# Patient Record
Sex: FEMALE | URBAN - METROPOLITAN AREA
[De-identification: names, ages, dates, MRNs, and addresses within clinical notes are randomized per-mention and may not be internally consistent; named-entity substitution may affect disease eponyms.]

---

## 2017-01-09 ENCOUNTER — OFFICE VISIT (OUTPATIENT)
Dept: OTHER | Facility: OUTPATIENT CENTER | Age: 52
End: 2017-01-09

## 2017-01-09 DIAGNOSIS — F64.0 GENDER DYSPHORIA IN ADOLESCENT AND ADULT: Primary | ICD-10-CM

## 2017-01-09 DIAGNOSIS — F32.A DEPRESSION, UNSPECIFIED DEPRESSION TYPE: ICD-10-CM

## 2017-01-09 NOTE — PROGRESS NOTES
"[facility]  Case Progress Note      Client Name: Isabella Mclaughlin  YOB: 1965  MRN: [id number]  Treating Provider: Logan Arzate Psy.D.  Type of Session: Individual  Present in Session: Client only  Number of Minutes: 55  Treatment Plan due: 10/26/17  Date of Service: 17      Current Symptoms/Status:  Client is a ciarra male who identifies as a transgender woman. 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  Client also reported experiencing chronic worry over making others feel uncomfortable because of her gender identification.  Client endorsed recent feelings of hopelessness related to others, particularly her family members ever supporting and understanding her as a trans* person.  Client reported anxiety symptoms including chronic worry and difficulty controlling worry, as well as depressive symptoms including negative self-talk, decreased mood, and passive suicidal ideation.     Progress Toward Treatment Goals:   Client has been setting boundaries with those people she perceives as abusive and and unsupportive. She has been consistent in taking antidepressants and following through on behavioral activation techniques and has noted an improvement in depressive symptoms.  Client has been working to build her relationship with new friends.  She has also been going to a weekly bowling group.      Intervention: Modality and Description/ Response:  Utilized cognitive behavioral, solution-focused, and psychodynamic techniques to address gender dysphoria and client's concerns regarding internalized transphobia and transition related goals.  Client reported that she is still struggling to get up in the morning, as she has been having low " motivation.  Reframed her beliefs that staying in bed will be better for her beliefs than getting up and doing nothing productive.  Identified a list of proactive, pleasurable things that she could look forward to doing on her days off.     Client stated that she has plans to go out to eat with a new friend from work.  She added that she has also reached out to another friend from work and they have been spending more time together.  Reinforced her making this efforts.  Client stated that she now has a set date for April 30th with her vaginoplasty surgeon.  Discussed client's aftercare plan, which is staying in [place] for three weeks with her cousin taking care of her.  Identified ways in client might be able to finance her trip in a more feasible manner.  Client was engaged and open to feedback.  She presented in a more upbeat mood than in previous sessions.     Assignment:  Client will continue to practice behavioral activation strategies identified in session.     Ongoing: Client will continue to work on reaching out to her support network.  She will reach out to this writer should suicidal thoughts increase in severity.     Diagnosis:  Gender dysphoria in adolescents or adults - 302.85  Unspecified Depressive Disorder    R/O  Unspecified Anxiety Disorder    Interactive Complexity  None    Plan / Need for Future Services:  Client will be seen in 2 weeks for follow-up appointment.      Logan Arzate Psy.D., JENSEN  Postdoctoral Fellow

## 2017-01-10 NOTE — PROGRESS NOTES
I did not personally see the patient.  I reviewed and agree with the assessment and plan as documented in this note. Fe Nicholas, PhD, LP

## 2017-02-07 ENCOUNTER — OFFICE VISIT (OUTPATIENT)
Dept: OTHER | Facility: OUTPATIENT CENTER | Age: 52
End: 2017-02-07

## 2017-02-07 DIAGNOSIS — F64.0 GENDER DYSPHORIA IN ADOLESCENT AND ADULT: Primary | ICD-10-CM

## 2017-02-07 DIAGNOSIS — F32.A DEPRESSION, UNSPECIFIED DEPRESSION TYPE: ICD-10-CM

## 2017-02-07 NOTE — PROGRESS NOTES
"[facility]  Case Progress Note      Client Name: Isabella Mclaughlin  YOB: 1965  MRN: [id number]  Treating Provider: Logan Arzate Psy.D., LP  Type of Session: Individual  Present in Session: Client only  Number of Minutes: 55  Treatment Plan due: 10/26/17  Date of Service: 2/07/17      Current Symptoms/Status:  Client is birth assigned male who identifies as a transgender woman. 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  Client also reported experiencing chronic worry over making others feel uncomfortable because of her gender identification.  Client endorsed recent feelings of hopelessness related to others, particularly her family members ever supporting and understanding her as a trans* person.  Client reported anxiety symptoms including chronic worry and difficulty controlling worry, as well as depressive symptoms including negative self-talk, decreased mood, and passive suicidal ideation.     Progress Toward Treatment Goals:   Client has been setting boundaries with those people she perceives as abusive and and unsupportive. She has been consistent in taking antidepressants and following through on behavioral activation techniques and has noted a significant improvement in depressive symptoms.  Client has been working to build her relationship with new friends.     Intervention: Modality and Description/ Response:  Utilized cognitive behavioral, solution-focused, and psychodynamic techniques to address gender dysphoria and client's concerns regarding internalized transphobia and transition related goals.  Client reported that she has noticed a significant improvement in depression since last session.  Discussed potential contributing " factors to improvement, including beginning antidepressants, following through on behavioral activation techniques, and working on building her friendships.  Reinforced her being consistent with these efforts.  She added that she is also feeling excited about her date for vaginoplasty, which is set on .  Discussed updating her letters, as it appears they will  a couple weeks before her surgery date.  She called her surgeon's office while in session who informed her that it would not be an issue.   Developed client's plans for aftercare, including lodging while recovering in [place].  Client reported that her work is being cooperative in her taking time off and have been supportive overall.  Discussed client's new friendships and strategies for building upon these. Client was engaged and open to feedback.  She presented in a more upbeat mood than in previous sessions.     Assignment:  Ongoing: Client will continue to practice behavioral activation strategies identified in session.  Client will continue to work on reaching out to her support network..     Diagnosis:  Gender dysphoria in adolescents or adults - 302.85  Unspecified Depressive Disorder    R/O  Unspecified Anxiety Disorder    Interactive Complexity  None    Plan / Need for Future Services:  Client will be seen in 2 weeks for follow-up appointment.      Logan Arzate Psy.D., JENSEN  Postdoctoral Fellow

## 2017-02-07 NOTE — Clinical Note
This client is scheduled for surgery.  Her letters  a couple weeks before her date.  The clinic told her this was not an issue.  Does that sound reliable or should I just plan to update the letter to be safe?  Thanks.

## 2017-02-14 ENCOUNTER — OFFICE VISIT (OUTPATIENT)
Dept: OTHER | Facility: OUTPATIENT CENTER | Age: 52
End: 2017-02-14

## 2017-02-14 DIAGNOSIS — F64.0 GENDER DYSPHORIA IN ADOLESCENT AND ADULT: Primary | ICD-10-CM

## 2017-02-14 DIAGNOSIS — F32.A DEPRESSION, UNSPECIFIED DEPRESSION TYPE: ICD-10-CM

## 2017-02-14 NOTE — MR AVS SNAPSHOT
After Visit Summary   2/14/2017    Isabella Mclaughlin    MRN: [id number]           Patient Information     Date Of Birth          1965        Visit Information        Provider Department      2/14/2017 12:00 PM Logan Arzate PsyD [facility]        Today's Diagnoses     Gender dysphoria in adolescent and adult    -  1    Depression, unspecified depression type           Follow-ups after your visit        Your next 10 appointments already scheduled     Feb 21, 2017 10:00 AM CST   INDIVIDUAL THERAPY with Logan Arzate PsyD   [facility] ([facility])    [address]  [address]  [facility] [address]   [phone]            Mar 21, 2017  9:00 AM CDT   INDIVIDUAL THERAPY with Logan Arzate PsyD   [facility] ([facility])    [address]  [address]  [facility] [address]   [phone]              Who to contact     Please call your clinic at [phone] to:    Ask questions about your health    Make or cancel appointments    Discuss your medicines    Learn about your test results    Speak to your doctor   If you have compliments or concerns about an experience at your clinic, or if you wish to file a complaint, please contact [facility] Patient Relations at [phone] or email us at [email or web address]         Additional Information About Your Visit        CallidusCloudhart Information     [employer] gives you secure access to your electronic health record. If you see a primary care provider, you can also send messages to your care team and make appointments. If you have questions, please call your primary care clinic.  If you do not have a primary care provider, please call [phone] and they will assist you.      [employer] is an electronic gateway that provides easy, online access to your medical records. With [employer], you can request a clinic appointment, read your test results, renew a  prescription or communicate with your care team.     To access your existing account, please contact your [facility] or call [phone] for assistance.        Care EveryWhere ID     This is your Care EveryWhere ID. This could be used by other organizations to access your [place] medical records  [id number]         Blood Pressure from Last 3 Encounters:   09/21/16 99/64   03/17/16 110/73   10/19/15 112/78    Weight from Last 3 Encounters:   09/21/16 73 kg (161 lb)   03/17/16 75.8 kg (167 lb)   10/19/15 74.8 kg (165 lb)              We Performed the Following     Individual Psychotherapy (38-52 min) [92614]        Primary Care Provider    None       No address on file        Thank you!     Thank you for choosing [facility]  for your care. Our goal is always to provide you with excellent care. Hearing back from our patients is one way we can continue to improve our services. Please take a few minutes to complete the written survey that you may receive in the mail after your visit with us. Thank you!             Your Updated Medication List - Protect others around you: Learn how to safely use, store and throw away your medicines at www.disposemymeds.org.          This list is accurate as of: 2/14/17 11:59 PM.  Always use your most recent med list.                   Brand Name Dispense Instructions for use    dutasteride 0.5 MG capsule    AVODART    90 capsule    Take 1 capsule (0.5 mg) by mouth daily       estradiol 0.1 MG/24HR BIW patch    VIVELLE-DOT    48 patch    Place 2 patches onto the skin twice a week       spironolactone 100 MG tablet    ALDACTONE    180 tablet    Take 2 tablets (200 mg) by mouth daily

## 2017-02-14 NOTE — PROGRESS NOTES
"[facility] -  Case Progress Note      Client Name: Isabella Mclaughlin  YOB: 1965  MRN: [id number]  Treating Provider: Logan Arzate Psy.D., LP  Type of Session: Individual  Present in Session: Client only  Number of Minutes: 50  Treatment Plan due: 10/26/17  Date of Service: 2/14/17      Current Symptoms/Status:  Client is birth assigned male who identifies as a transgender woman. 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  Client also reported experiencing chronic worry over making others feel uncomfortable because of her gender identification.  Client endorsed recent feelings of hopelessness related to others, particularly her family members ever supporting and understanding her as a trans* person.  Client reported anxiety symptoms including chronic worry and difficulty controlling worry, as well as depressive symptoms including negative self-talk, decreased mood, and passive suicidal ideation.     Progress Toward Treatment Goals:   She has been consistent in taking antidepressants and following through on behavioral activation techniques and has noted a significant improvement in depressive symptoms.  Client has been working to build her relationship with new friends.     Intervention: Modality and Description/ Response:  Utilized cognitive behavioral, solution-focused, and psychodynamic techniques to address gender dysphoria and client's concerns regarding internalized transphobia and transition related goals.  Client reported that she has booked her hotel for her surgery in [place].  She reported that her cousin will now only be taking care of her for a few days, rather than the full week. Validated her concerns about this, as it will " "leave her alone in [place].  Client stated that she is working on finding someone else to stay with her longer.    Client reported that her depression has been significantly improved since beginning antidepressants and working on behavioral activation strategies.  Discussed client's shifting attitudes about her trans* identity.  She reported that she is beginning to feel more proud of her trans* identity as she has more people validate her identity and has set boundaries with non-affirming family members and friends.  Continued to process and explore client's history of \"self-loathing\" regarding her gender identity.  Client reported that she used to believe that she was always going to be alone because of her trans* identity.  Validated her pain and hurt over those who have rejected her and discussed the improvement she has noticed since working to accept herself and build a family of choice. Continued to process and address past trauma related to coming out in the early 1990's. Validated client's feelings of anger and sadness over the rejection she experienced from others.   Client was engaged and open to feedback.  She presented in a more upbeat mood than in previous sessions.     Assignment:  Ongoing: Client will continue to practice behavioral activation strategies identified in session.  Client will continue to work on reaching out to her support network..     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      Logan Arzate Psy.D., LP  Postdoctoral Fellow                                  "

## 2017-02-21 ENCOUNTER — OFFICE VISIT (OUTPATIENT)
Dept: OTHER | Facility: OUTPATIENT CENTER | Age: 52
End: 2017-02-21

## 2017-02-21 DIAGNOSIS — F32.A DEPRESSION, UNSPECIFIED DEPRESSION TYPE: ICD-10-CM

## 2017-02-21 DIAGNOSIS — F64.0 GENDER DYSPHORIA IN ADOLESCENT AND ADULT: Primary | ICD-10-CM

## 2017-02-21 NOTE — MR AVS SNAPSHOT
After Visit Summary   2/21/2017    Isabella Mclaughlin    MRN: [id number]           Patient Information     Date Of Birth          1965        Visit Information        Provider Department      2/21/2017 10:00 AM Logan Arzate PsyD [place] [facility]        Today's Diagnoses     Gender dysphoria in adolescent and adult    -  1    Depression, unspecified depression type           Follow-ups after your visit        Your next 10 appointments already scheduled     Mar 21, 2017  9:00 AM CDT   INDIVIDUAL THERAPY with Logan Arzate PsyD   [place] [facility]    [address]  [address]  [facility] [address]   [phone]              Who to contact     Please call your clinic at [phone] to:    Ask questions about your health    Make or cancel appointments    Discuss your medicines    Learn about your test results    Speak to your doctor   If you have compliments or concerns about an experience at your clinic, or if you wish to file a complaint, please contact [facility] Patient Relations at [phone] or email us at [email or web address]         Additional Information About Your Visit        MyChart Information     [employer] gives you secure access to your electronic health record. If you see a primary care provider, you can also send messages to your care team and make appointments. If you have questions, please call your primary care clinic.  If you do not have a primary care provider, please call [phone] and they will assist you.      [employer]O is an electronic gateway that provides easy, online access to your medical records. With [employer], you can request a clinic appointment, read your test results, renew a prescription or communicate with your care team.     To access your existing account, please contact your [facility] or call [phone] for assistance.        Care EveryWhere ID      This is your Care EveryWhere ID. This could be used by other organizations to access your [place] medical records  [id number]         Blood Pressure from Last 3 Encounters:   09/21/16 99/64   03/17/16 110/73   10/19/15 112/78    Weight from Last 3 Encounters:   09/21/16 73 kg (161 lb)   03/17/16 75.8 kg (167 lb)   10/19/15 74.8 kg (165 lb)              We Performed the Following     Individual Psychotherapy (53+ min) [97720]        Primary Care Provider    None       No address on file        Thank you!     Thank you for choosing [facility]  for your care. Our goal is always to provide you with excellent care. Hearing back from our patients is one way we can continue to improve our services. Please take a few minutes to complete the written survey that you may receive in the mail after your visit with us. Thank you!             Your Updated Medication List - Protect others around you: Learn how to safely use, store and throw away your medicines at www.disposemymeds.org.          This list is accurate as of: 2/21/17 11:59 PM.  Always use your most recent med list.                   Brand Name Dispense Instructions for use    dutasteride 0.5 MG capsule    AVODART    90 capsule    Take 1 capsule (0.5 mg) by mouth daily       estradiol 0.1 MG/24HR BIW patch    VIVELLE-DOT    48 patch    Place 2 patches onto the skin twice a week       spironolactone 100 MG tablet    ALDACTONE    180 tablet    Take 2 tablets (200 mg) by mouth daily

## 2017-03-06 DIAGNOSIS — F64.0 GENDER IDENTITY DISORDER IN ADOLESCENTS OR ADULTS: ICD-10-CM

## 2017-03-07 RX ORDER — ESTRADIOL 0.1 MG/D
2 FILM, EXTENDED RELEASE TRANSDERMAL
Qty: 48 PATCH | Refills: 0 | Status: SHIPPED | OUTPATIENT
Start: 2017-03-09 | End: 2017-04-25

## 2017-03-15 LAB
ANION GAP SERPL CALCULATED.3IONS-SCNC: 8 MMOL/L
BUN SERPL-MCNC: 12 MG/DL
CALCIUM SERPL-MCNC: 8.7 MG/DL
CHLORIDE SERPLBLD-SCNC: 104 MMOL/L
CO2 SERPL-SCNC: 28 MMOL/L
CREAT SERPL-MCNC: 1 MG/DL
GFR SERPL CREATININE-BSD FRML MDRD: >60 ML/MIN/1.73M2
GLUCOSE SERPL-MCNC: 93 MG/DL (ref 70–99)
POTASSIUM SERPL-SCNC: 3.9 MMOL/L
SODIUM SERPL-SCNC: 140 MMOL/L

## 2017-03-21 ENCOUNTER — OFFICE VISIT (OUTPATIENT)
Dept: OTHER | Facility: OUTPATIENT CENTER | Age: 52
End: 2017-03-21

## 2017-03-21 DIAGNOSIS — F64.0 GENDER DYSPHORIA IN ADOLESCENT AND ADULT: Primary | ICD-10-CM

## 2017-03-21 DIAGNOSIS — F32.A DEPRESSION, UNSPECIFIED DEPRESSION TYPE: ICD-10-CM

## 2017-03-21 NOTE — MR AVS SNAPSHOT
After Visit Summary   3/21/2017    Isabella Mclaughlin    MRN: [id number]           Patient Information     Date Of Birth          1965        Visit Information        Provider Department      3/21/2017 9:00 AM Logan Arzate PsyD [place] [facility]        Today's Diagnoses     Gender dysphoria in adolescent and adult    -  1    Depression, unspecified depression type           Follow-ups after your visit        Your next 10 appointments already scheduled     May 04, 2017  2:00 PM CDT   INDIVIDUAL THERAPY with Logan Arzate PsyD   [place] [facility] ([facility])    [address]  [address]  [facility] [address]   [phone]            May 15, 2017  1:00 PM CDT   INDIVIDUAL THERAPY with Logan Arzate PsyD   [place] [facility] ([facility])    [address]  [address]  [facility] [address]   [phone]            May 31, 2017  2:00 PM CDT   INDIVIDUAL THERAPY with Logan Arzate PsyD   [place] [facility] ([facility])    [address]  [address]  [facility] [address]   [phone]              Who to contact     Please call your clinic at [phone] to:    Ask questions about your health    Make or cancel appointments    Discuss your medicines    Learn about your test results    Speak to your doctor   If you have compliments or concerns about an experience at your clinic, or if you wish to file a complaint, please contact [facility] Patient Relations at [phone] or email us at [email or web address]         Additional Information About Your Visit        MyChart Information     [employer] gives you secure access to your electronic health record. If you see a primary care provider, you can also send messages to your care team and make appointments. If you have questions, please call your primary care clinic.  If you do not have a primary care provider, please call [phone]  and they will assist you.      Ch[employer]fy is an electronic gateway that provides easy, online access to your medical records. With [employer], you can request a clinic appointment, read your test results, renew a prescription or communicate with your care team.     To access your existing account, please contact your [facility] or call [phone] for assistance.        Care EveryWhere ID     This is your Care EveryWhere ID. This could be used by other organizations to access your [place] medical records  [id number]         Blood Pressure from Last 3 Encounters:   09/21/16 99/64   03/17/16 110/73   10/19/15 112/78    Weight from Last 3 Encounters:   09/21/16 73 kg (161 lb)   03/17/16 75.8 kg (167 lb)   10/19/15 74.8 kg (165 lb)              We Performed the Following     Individual Psychotherapy (53+ min) [42397]        Primary Care Provider    None       No address on file        Thank you!     Thank you for choosing [facility]  for your care. Our goal is always to provide you with excellent care. Hearing back from our patients is one way we can continue to improve our services. Please take a few minutes to complete the written survey that you may receive in the mail after your visit with us. Thank you!             Your Updated Medication List - Protect others around you: Learn how to safely use, store and throw away your medicines at www.disposemymeds.org.          This list is accurate as of: 3/21/17 11:59 PM.  Always use your most recent med list.                   Brand Name Dispense Instructions for use    dutasteride 0.5 MG capsule    AVODART    90 capsule    Take 1 capsule (0.5 mg) by mouth daily       estradiol 0.1 MG/24HR BIW patch    VIVELLE-DOT    48 patch    Place 2 patches onto the skin twice a week       spironolactone 100 MG tablet    ALDACTONE    180 tablet    Take 2 tablets (200 mg) by mouth daily
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
## 2017-03-22 NOTE — PROGRESS NOTES
I did not personally see the patient but I have reviewed and agree with the assessment and plan as documented in this note.  Anastasiya Bender, PhD -- Supervisor   Licensed Psychologist

## 2017-04-13 ENCOUNTER — TELEPHONE (OUTPATIENT)
Dept: OTHER | Facility: OUTPATIENT CENTER | Age: 52
End: 2017-04-13

## 2017-04-25 ENCOUNTER — OFFICE VISIT (OUTPATIENT)
Dept: OTHER | Facility: OUTPATIENT CENTER | Age: 52
End: 2017-04-25

## 2017-04-25 VITALS
HEART RATE: 80 BPM | HEIGHT: 69 IN | DIASTOLIC BLOOD PRESSURE: 66 MMHG | WEIGHT: 166.6 LBS | BODY MASS INDEX: 24.68 KG/M2 | SYSTOLIC BLOOD PRESSURE: 100 MMHG

## 2017-04-25 DIAGNOSIS — Z79.890 HORMONE REPLACEMENT THERAPY (POSTMENOPAUSAL): Primary | ICD-10-CM

## 2017-04-25 RX ORDER — ESTRADIOL 0.1 MG/D
1 FILM, EXTENDED RELEASE TRANSDERMAL
Qty: 24 PATCH | Refills: 2 | Status: SHIPPED | OUTPATIENT
Start: 2017-04-27

## 2017-04-25 RX ORDER — DOCUSATE SODIUM 100 MG/1
100 CAPSULE, LIQUID FILLED ORAL
COMMUNITY
Start: 2017-04-04 | End: 2017-04-25

## 2017-04-25 RX ORDER — MUPIROCIN 20 MG/G
OINTMENT TOPICAL
COMMUNITY
Start: 2017-04-04 | End: 2017-05-04

## 2017-04-25 RX ORDER — SENNOSIDES A AND B 8.6 MG/1
17.2 TABLET, FILM COATED ORAL
COMMUNITY
Start: 2017-04-04 | End: 2017-04-25

## 2017-04-25 RX ORDER — PAROXETINE 20 MG/1
20 TABLET, FILM COATED ORAL
COMMUNITY

## 2017-04-25 RX ORDER — TERBINAFINE HYDROCHLORIDE 250 MG/1
250 TABLET ORAL
COMMUNITY
End: 2017-04-25

## 2017-04-25 RX ORDER — ACETAMINOPHEN 325 MG/1
650 TABLET ORAL
COMMUNITY
Start: 2017-04-04 | End: 2017-05-04

## 2017-04-25 ASSESSMENT — ENCOUNTER SYMPTOMS
LIGHT-HEADEDNESS: 0
WEAKNESS: 0
VOMITING: 0
HEADACHES: 0
SHORTNESS OF BREATH: 0
FREQUENCY: 0
UNEXPECTED WEIGHT CHANGE: 0
ABDOMINAL PAIN: 0
CHEST TIGHTNESS: 0
NERVOUS/ANXIOUS: 0
FEVER: 0
DYSPHORIC MOOD: 0
NUMBNESS: 0
CHILLS: 0
PALPITATIONS: 0
POLYDIPSIA: 0

## 2017-04-25 NOTE — MR AVS SNAPSHOT
After Visit Summary   4/25/2017    Isabella Mclaughlin    MRN: [id number]           Patient Information     Date Of Birth          1965        Visit Information        Provider Department      4/25/2017 1:20 PM Markel Shepherd MD [facility]        Today's Diagnoses     Hormone replacement therapy (postmenopausal)    -  1       Follow-ups after your visit        Follow-up notes from your care team     Return in about 3 months (around 7/25/2017).      Your next 10 appointments already scheduled     May 04, 2017  2:00 PM CDT   INDIVIDUAL THERAPY with Logan Arzate PsyD   [facility] ([facility])    [address]  [address]  [facility] [address]   [phone]            May 15, 2017  1:00 PM CDT   INDIVIDUAL THERAPY with Logan Arzate PsyD   [facility] ([facility])    [address]  Mail [address]  [facility] [address]   [phone]            May 31, 2017  2:00 PM CDT   INDIVIDUAL THERAPY with Logan Arzate PsyD   [facility] ([facility])    [address]  [address]  [facility] [address]   [phone]              Who to contact     Please call your clinic at [phone] to:    Ask questions about your health    Make or cancel appointments    Discuss your medicines    Learn about your test results    Speak to your doctor   If you have compliments or concerns about an experience at your clinic, or if you wish to file a complaint, please contact [facility] Patient Relations at [phone] or email us at [email or web address]         Additional Information About Your Visit        MyChart Information     E-Box - [email or web address] gives you secure access to your electronic health record. If you see a primary care provider, you can also send messages to your care team and make appointments. If you have questions, please call your primary care clinic.  If you do not have a  "primary care provider, please call [phone] and they will assist you.      Good Seed is an electronic gateway that provides easy, online access to your medical records. With [employer] Seed, you can request a clinic appointment, read your test results, renew a prescription or communicate with your care team.     To access your existing account, please contact your [facility] or call [phone] for assistance.        Care EveryWhere ID     This is your Care EveryWhere ID. This could be used by other organizations to access your [facility] medical records  [id number]        Your Vitals Were     Pulse Height BMI (Body Mass Index)             80 1.753 m (5' 9\") 24.6 kg/m2          Blood Pressure from Last 3 Encounters:   04/25/17 100/66   09/21/16 99/64   03/17/16 110/73    Weight from Last 3 Encounters:   04/25/17 75.6 kg (166 lb 9.6 oz)   09/21/16 73 kg (161 lb)   03/17/16 75.8 kg (167 lb)              We Performed the Following     Basic metabolic panel          Today's Medication Changes          These changes are accurate as of: 4/25/17 11:59 PM.  If you have any questions, ask your nurse or doctor.               Start taking these medicines.        Dose/Directions    estradiol 0.1 MG/24HR BIW patch   Commonly known as:  VIVELLE-DOT   Used for:  Hormone replacement therapy (postmenopausal)        Dose:  1 patch   Place 1 patch onto the skin twice a week   Quantity:  24 patch   Refills:  2            Where to get your medicines      These medications were sent to [facility] [id number] [place] [address] AT [place] [address], [facility] [address]     Phone:  [phone]     estradiol 0.1 MG/24HR BIW patch                Primary Care Provider Office Phone # Fax #    [facility] [phone] [address] [facility] [address]        Thank you!     Thank you for choosing [facility]  " for your care. Our goal is always to provide you with excellent care. Hearing back from our patients is one way we can continue to improve our services. Please take a few minutes to complete the written survey that you may receive in the mail after your visit with us. Thank you!             Your Updated Medication List - Protect others around you: Learn how to safely use, store and throw away your medicines at www.disposemymeds.org.          This list is accurate as of: 4/25/17 11:59 PM.  Always use your most recent med list.                   Brand Name Dispense Instructions for use    acetaminophen 325 MG tablet    TYLENOL     Take 650 mg by mouth       estradiol 0.1 MG/24HR BIW patch    VIVELLE-DOT    24 patch    Place 1 patch onto the skin twice a week       mupirocin 2 % ointment    BACTROBAN         OXYCODONE HCL PO      Take 5 mg by mouth as needed       PARoxetine 20 MG tablet    PAXIL     Take 20 mg by mouth

## 2017-04-25 NOTE — NURSING NOTE
"Chief Complaint   Patient presents with     RECHECK       Vitals:    04/25/17 1347   BP: 100/66   Pulse: 80   Weight: 75.6 kg (166 lb 9.6 oz)   Height: 1.753 m (5' 9\")       Body mass index is 24.6 kg/(m^2).      Maria Del Carmen Ceballos CMA                        "

## 2017-04-25 NOTE — PROGRESS NOTES
ZACHARY Mason is a 51 year old individual that uses pronouns She/Her/Hers/Herself that presents today for follow up of:  feminizing hormone therapy.   Gender identity: female.   Started Hormone  therapy  1997  Continues on.See below  Any special concerns today?  Had gender confirmation surgery on 32/30/2017 with Dr. Temitope Faustin at [facility]. No complications. Dilating 4x/day. No difficulties with urination.   She did not restart hormones after this surgery, waited until this visit.  Up and doing all IADL's, able to walk easily, but fatigues.  Never did labs from 9/2016, but has labs from PCP office at [facility] and all of surgeons notes are in [facility] records.       On hormones?  No  Gender related body changes since last visit: none--see above    Activity: walking  New health concerns since last visit: Broke a tooth during hospital stay. Just before surgery had 3 day episode of hematuria, and extensive evaluation was negative, felt due to infection of prostate. Has not returned.  ---    No past surgical history on file.    Patient Active Problem List   Diagnosis     Gender dysphoria in adolescent and adult     S/P breast augmentation     S/P rhinoplasty     Spinal cord compression (H)     Depression       Current Outpatient Prescriptions   Medication Sig Dispense Refill     PARoxetine (PAXIL) 20 MG tablet Take 20 mg by mouth       mupirocin (BACTROBAN) 2 % ointment        acetaminophen (TYLENOL) 325 MG tablet Take 650 mg by mouth       OXYCODONE HCL PO Take 5 mg by mouth as needed       estradiol (VIVELLE-DOT) 0.1 MG/24HR BIW patch Place 2 patches onto the skin twice a week (Patient not taking: Reported on 4/25/2017) 48 patch 0     spironolactone (ALDACTONE) 100 MG tablet Take 2 tablets (200 mg) by mouth daily (Patient not taking: Reported on 4/25/2017) 180 tablet 1       History   Smoking Status     Never Smoker   Smokeless Tobacco     Not on file        No Known Allergies    Health Maintenance Due  "  Topic Date Due     TETANUS IMMUNIZATION (SYSTEM ASSIGNED)  07/29/1983     HEPATITIS C SCREENING  07/29/1983     PAP SCREENING Q3 YR (SYSTEM ASSIGNED)  07/29/1986     MAMMO SCREEN Q2 YR (SYSTEM ASSIGNED)  07/29/2005     COLON CANCER SCREEN (SYSTEM ASSIGNED)  07/29/2015         Problem, Medication and Allergy Lists were reviewed and are current..         Review of Systems:   Review of Systems   Constitutional: Negative for chills, fever and unexpected weight change.   Eyes: Negative for visual disturbance.   Respiratory: Negative for chest tightness and shortness of breath.    Cardiovascular: Negative for chest pain, palpitations and leg swelling.   Gastrointestinal: Negative for abdominal pain and vomiting.   Endocrine: Negative for polydipsia and polyuria.   Genitourinary: Negative for frequency.   Neurological: Negative for weakness, light-headedness, numbness and headaches.   Psychiatric/Behavioral: Negative for dysphoric mood and suicidal ideas. The patient is not nervous/anxious.             Physical Exam:     Vitals:    04/25/17 1347   BP: 100/66   Pulse: 80   Weight: 75.6 kg (166 lb 9.6 oz)   Height: 1.753 m (5' 9\")     BMI= Body mass index is 24.6 kg/(m^2).   Wt Readings from Last 10 Encounters:   04/25/17 75.6 kg (166 lb 9.6 oz)   09/21/16 73 kg (161 lb)   03/17/16 75.8 kg (167 lb)   10/19/15 74.8 kg (165 lb)   02/04/15 73.5 kg (162 lb)     Appearance: Female appearance and dress    GENERAL:: healthy, alert and no distress  RESP: lungs clear to auscultation - no rales, no rhonchi, no wheezes  CV: regular rates and rhythm, normal S1 S2, no S3 or S4 and no murmur, no click or rub -  EXT--NT  External  exam--Anatomical alignment appropriate; suture lines intact with healing appropriate to post-op stage. Sutures visible at medial aspect  labia minora bilaterally, with some loosening of sutures and separation on right.   No prurulent drainage, no bleeding    Affect: Appropriate/mood-congruent           Labs: "   Results from last visit:  Office Visit on 03/17/2016   Component Date Value Ref Range Status     Sodium 12/14/2015 137  mmol/L Final     Potassium 12/14/2015 4.4  mmol/L Final     Chloride 12/14/2015 104  mmol/L Final     CO2, TOTAL 12/14/2015 27  mmol/L Final     Anion Gap 12/14/2015 6  mmol/L Final     Glucose 12/14/2015 92  70 - 99 mg/dL Final     Urea Nitrogen 12/14/2015 14  mg/dL Final     Creatinine 12/14/2015 0.83  mg/dL Final     Calcium 12/14/2015 9.1  mg/dL Final     Cholesterol 12/14/2015 148  115 - 199 mg/dL Final     Triglycerides 12/14/2015 96  mg/dL Final     HDL Cholesterol 12/14/2015 37  mg/dL Final     LDL Cholesterol Calculated 12/14/2015 92  mg/dL Final     Non HDL Cholesterol 12/14/2015 111  mg/dl Final     Testosterone Total 12/14/2015 12  ng/dL Final       Assessment and Plan   1. Gender dysphoria s/p gender confirmation surgery  Labs from [facility] 3/2017 reviewed with patient  Discussed post-operative hormone maintenance dosing in light of age and lack of cardiac risk factors.   Discontinue spironolactone. Start Vivelle dot 0.1 mg patch 2x/wk    2. Gender specific health care maintenance reviewed  Recommend yearly mammogram  Discussed PSA/prostate cancer, inaccuracies of PSA test in low testosterone state; reviewed signs/symptoms prostate cancer  Can consider PCP taking over hormone maintenance in future        Follow up:  Follow up in 3 months.  Results by mychart  Questions were elicited and answered.     Markel Shepherd MD

## 2017-04-27 PROBLEM — Z87.890: Status: ACTIVE | Noted: 2017-04-27

## 2017-05-15 ENCOUNTER — TELEPHONE (OUTPATIENT)
Dept: OTHER | Facility: OUTPATIENT CENTER | Age: 52
End: 2017-05-15

## 2017-05-22 ENCOUNTER — OFFICE VISIT (OUTPATIENT)
Dept: OTHER | Facility: OUTPATIENT CENTER | Age: 52
End: 2017-05-22

## 2017-05-22 DIAGNOSIS — F32.A DEPRESSION, UNSPECIFIED DEPRESSION TYPE: ICD-10-CM

## 2017-05-22 DIAGNOSIS — F64.0 GENDER DYSPHORIA IN ADOLESCENT AND ADULT: Primary | ICD-10-CM

## 2017-05-22 NOTE — PROGRESS NOTES
[facility] -  Case Progress Note      Client Name: Isabella Mclaughlin  YOB: 1965  MRN: [id number]  Treating Provider: Logan Arzate Psy.D., LP  Type of Session: Individual  Present in Session: Client only  Number of Minutes: 45  Treatment Plan due: 10/26/17  Date of Service: 5/22/17    Current Symptoms/Status:  Client is birth assigned male who identifies as a transgender woman. 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  She had vaginoplasty done in April 2017, which she reported has helped increase confidence. Client also reported experiencing chronic worry over making others feel uncomfortable because of her gender identification.  Client endorsed recent feelings of hopelessness related to others, particularly her family members ever supporting and understanding her as a trans* person.  Client reported anxiety symptoms including chronic worry and difficulty controlling worry, as well as depressive symptoms including negative self-talk, low mood,     Progress Toward Treatment Goals:   Client had vaginoplasty and reported a positive experience with aftercare and compliance with physical therapy.  She noted an improvement in mood and confidence.      Intervention: Modality and Description/ Response:  Utilized cognitive behavioral, solution-focused, and psychodynamic techniques to address gender dysphoria and client's concerns regarding internalized transphobia and transition related goals.   Discussed client's experience of getting vaginoplasty six weeks ago. Client stated that she is feeling very good and returned to work a couple weeks ago.  She noted an improvement in her confidence, despite having some physical discomfort and fatigue.  Explored her experience of physical therapy and dialation.  She stated that she  "has continued to struggle with some \"mild depression.\"  Discussed the potential intersection of isolation and chronic fatigue with depressive.  Client noted that she has been feeling increased sadness with her children no longer speaking with her, due to her transition.  Continued to explore and identify her feelings about the reactions of others to her transition, as few members of her family will still speak to her.  Validated client's ongoing feelings of hurt about this.  Revisited strategies for managing mood.  Client was engaged and open to feedback.  She presented in an improved mood from previous session.     Assignment:   Client will begin journaling about her gender history/journey.    Ongoing: Client will continue to practice depression management strategies identified in session.  Client will continue to work on reaching out to her support network.     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      Logan Arzate Psy.D., JENSEN  Postdoctoral Fellow                                  "

## 2017-07-24 ENCOUNTER — OFFICE VISIT (OUTPATIENT)
Dept: OTHER | Facility: OUTPATIENT CENTER | Age: 52
End: 2017-07-24

## 2017-07-24 DIAGNOSIS — F32.A DEPRESSION, UNSPECIFIED DEPRESSION TYPE: ICD-10-CM

## 2017-07-24 DIAGNOSIS — F64.0 GENDER DYSPHORIA IN ADOLESCENT AND ADULT: Primary | ICD-10-CM
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
## 2017-07-24 NOTE — MR AVS SNAPSHOT
After Visit Summary   7/24/2017    Isabella Mclaughlin    MRN: [id number]           Patient Information     Date Of Birth          1965        Visit Information        Provider Department      7/24/2017 1:00 PM Logan Arzate PsyD [facility]        Today's Diagnoses     Gender dysphoria in adolescent and adult    -  1    Depression, unspecified depression type           Follow-ups after your visit        Your next 10 appointments already scheduled     Aug 07, 2017  2:00 PM CDT   INDIVIDUAL THERAPY with Logan Arzate PsyD   [facility]    [address]  Mail Code 7521  [facility] [place] [facility] [address]   [phone]              Who to contact     Please call your clinic at [phone] to:    Ask questions about your health    Make or cancel appointments    Discuss your medicines    Learn about your test results    Speak to your doctor   If you have compliments or concerns about an experience at your clinic, or if you wish to file a complaint, please contact [facility] Patient Relations at [phone] or email us at [email or web address]         Additional Information About Your Visit        MyChart Information     Audible Magict gives you secure access to your electronic health record. If you see a primary care provider, you can also send messages to your care team and make appointments. If you have questions, please call your primary care clinic.  If you do not have a primary care provider, please call [phone] and they will assist you.      ACE*COMM is an electronic gateway that provides easy, online access to your medical records. With ACE*COMM, you can request a clinic appointment, read your test results, renew a prescription or communicate with your care team.     To access your existing account, please contact your [facility] or call [phone] for assistance.        Care EveryWhere ID      This is your Care EveryWhere ID. This could be used by other organizations to access your [place] medical records  [id number]         Blood Pressure from Last 3 Encounters:   04/25/17 100/66   09/21/16 99/64   03/17/16 110/73    Weight from Last 3 Encounters:   04/25/17 75.6 kg (166 lb 9.6 oz)   09/21/16 73 kg (161 lb)   03/17/16 75.8 kg (167 lb)              We Performed the Following     Individual Psychotherapy (53+ min) [75065]        Primary Care Provider Office Phone # Fax #    Juan [facility] [phone] [address] [facility] [address]        Equal Access to Services     ESTEFANY DAO : Hadii claudia motto [place], waaxda luqadaha, qaybta kaalmada adeegyada, ehsan geronimo. So [facility] [phone].    ATENCIÓN: Si habla español, tiene a mojica disposición servicios gratuitos de asistencia lingüística. Ll[facility] [phone].    We comply with applicable federal civil rights laws and [place] laws. We do not discriminate on the basis of race, color, national origin, age, disability sex, sexual orientation or gender identity.            Thank you!     Thank you for choosing [facility]  for your care. Our goal is always to provide you with excellent care. Hearing back from our patients is one way we can continue to improve our services. Please take a few minutes to complete the written survey that you may receive in the mail after your visit with us. Thank you!             Your Updated Medication List - Protect others around you: Learn how to safely use, store and throw away your medicines at www.disposemymeds.org.          This list is accurate as of: 7/24/17 11:59 PM.  Always use your most recent med list.                   Brand Name Dispense Instructions for use Diagnosis    estradiol 0.1 MG/24HR BIW patch    VIVELLE-DOT    24 patch    Place 1 patch onto the skin twice a week    Hormone replacement therapy (postmenopausal)        OXYCODONE HCL PO      Take 5 mg by mouth as needed        PARoxetine 20 MG tablet    PAXIL     Take 20 mg by mouth

## 2017-08-07 ENCOUNTER — OFFICE VISIT (OUTPATIENT)
Dept: OTHER | Facility: OUTPATIENT CENTER | Age: 52
End: 2017-08-07

## 2017-08-07 DIAGNOSIS — F32.A DEPRESSION, UNSPECIFIED DEPRESSION TYPE: ICD-10-CM

## 2017-08-07 DIAGNOSIS — F64.0 GENDER DYSPHORIA IN ADOLESCENT AND ADULT: Primary | ICD-10-CM

## 2017-08-07 NOTE — MR AVS SNAPSHOT
After Visit Summary   8/7/2017    Isabella Mclaughlin    MRN: [id number]           Patient Information     Date Of Birth          1965        Visit Information        Provider Department      8/7/2017 2:00 PM Logan Arzate PsyD [place]        Today's Diagnoses     Gender dysphoria in adolescent and adult    -  1    Depression, unspecified depression type           Follow-ups after your visit        Your next 10 appointments already scheduled     Aug 28, 2017  3:00 PM CDT   INDIVIDUAL THERAPY with Logan Arzate PsyD   [facility]    [address]  Mail Code 7521  [facility] [address]   [phone]              Who to contact     Please call your clinic at [phone] to:    Ask questions about your health    Make or cancel appointments    Discuss your medicines    Learn about your test results    Speak to your doctor   If you have compliments or concerns about an experience at your clinic, or if you wish to file a complaint, please contact [facility] Patient Relations at [phone] or email us at [email or web address]         Additional Information About Your Visit        MyChart Information     Eventmag.rut gives you secure access to your electronic health record. If you see a primary care provider, you can also send messages to your care team and make appointments. If you have questions, please call your primary care clinic.  If you do not have a primary care provider, please call [phone] and they will assist you.      NetPayment is an electronic gateway that provides easy, online access to your medical records. With NetPayment, you can request a clinic appointment, read your test results, renew a prescription or communicate with your care team.     To access your existing account, please contact your [facility] or call [phone] for assistance.        Care EveryWhere ID      This is your Care EveryWhere ID. This could be used by other organizations to access your [place] medical records  [id number]         Blood Pressure from Last 3 Encounters:   04/25/17 100/66   09/21/16 99/64   03/17/16 110/73    Weight from Last 3 Encounters:   04/25/17 75.6 kg (166 lb 9.6 oz)   09/21/16 73 kg (161 lb)   03/17/16 75.8 kg (167 lb)              We Performed the Following     Individual Psychotherapy (53+ min) [67599]        Primary Care Provider Office Phone # Fax #    Juan [facility] [phone] [address] [facility] [address]        Equal Access to Services     ESTEFANY DAO : Hadii claudia motto [place], waaxda luqadaha, qaybta kaalmada adeegyada, ehsan geronimo. So [facility] [phone].    ATENCIÓN: Si habla español, tiene a mojica disposición servicios gratuitos de asistencia lingüística. Ll[facility] [phone].    We comply with applicable federal civil rights laws and [place] laws. We do not discriminate on the basis of race, color, national origin, age, disability sex, sexual orientation or gender identity.            Thank you!     Thank you for choosing [facility]  for your care. Our goal is always to provide you with excellent care. Hearing back from our patients is one way we can continue to improve our services. Please take a few minutes to complete the written survey that you may receive in the mail after your visit with us. Thank you!             Your Updated Medication List - Protect others around you: Learn how to safely use, store and throw away your medicines at www.disposemymeds.org.          This list is accurate as of: 8/7/17 11:59 PM.  Always use your most recent med list.                   Brand Name Dispense Instructions for use Diagnosis    estradiol 0.1 MG/24HR BIW patch    VIVELLE-DOT    24 patch    Place 1 patch onto the skin twice a week    Hormone replacement therapy (postmenopausal)        OXYCODONE HCL PO      Take 5 mg by mouth as needed        PARoxetine 20 MG tablet    PAXIL     Take 20 mg by mouth

## 2017-08-07 NOTE — PROGRESS NOTES
[facility] -  Case Progress Note      Client Name: Isabella Mclaughlin  YOB: 1965  MRN: [id number]  Treating Provider: Logan Arzate Psy.D., LP  Type of Session: Individual  Present in Session: Client only  Number of Minutes: 55  Treatment Plan due: 10/26/17  Date of Service: 8/07/17    Current Symptoms/Status:  Client is birth assigned male who identifies as a transgender woman. 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  She had vaginoplasty done in April 2017, which she reported has helped increase confidence. Client also reported experiencing chronic worry over making others feel uncomfortable because of her gender identification.  Client endorsed recent feelings of hopelessness related to others, particularly her family members ever supporting and understanding her as a trans* person.  Client reported anxiety symptoms including chronic worry and difficulty controlling worry, as well as depressive symptoms including negative self-talk, low mood,     Progress Toward Treatment Goals:   Client is working on being assertive with others.      Intervention: Modality and Description/ Response:  Utilized cognitive behavioral, solution-focused, and psychodynamic techniques to address gender dysphoria and client's concerns regarding internalized transphobia and transition related goals.   Client reported that she noticed a drop in mood since last session.  She stated that she has been having problems with her roommate who hasn't been paying rent.  She reported that he didn't pay her rent 8 of 12 months last year.  Discussed assertiveness strategies for getting him out of her house.  Explored how this is more of a financial necessity after she spent her savings on vaginoplasty. Explored her fears regarding being assertive and  potentially losing someone else from her [place] after losing so many people in the process of coming out and transitioning.  Validated her desire to keep people in her life and continued to discuss strategies for building her support system while setting boundaries with people who she feels do not respect her.  Identified fears the client stated that she has also been feeling more self conscious about how she looks lately.  Identified potential contributing factors to this. Clients stated that she saw a picture of another trans woman and felt that she was not as beautiful.  Continued to discuss ways in which client's feelings about herself as a woman may be impacting the ways that she sees herself and believes the world sees her.  Continued to speak about the concept of resilience in this process. Client presented in a low mood, but was engaged and open to feedback.     Assignment:   Client will continue journaling about her gender history/journey.    Ongoing: Client will continue to practice depression management strategies identified in session.  Client will continue to work on reaching out to her support network.     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      Logan Arzate Psy.D., JENSEN  Postdoctoral Fellow

## 2017-08-10 ENCOUNTER — MYC MEDICAL ADVICE (OUTPATIENT)
Dept: OTHER | Facility: OUTPATIENT CENTER | Age: 52
End: 2017-08-10

## 2017-08-28 ENCOUNTER — OFFICE VISIT (OUTPATIENT)
Dept: OTHER | Facility: OUTPATIENT CENTER | Age: 52
End: 2017-08-28

## 2017-08-28 DIAGNOSIS — F64.0 GENDER DYSPHORIA IN ADOLESCENT AND ADULT: Primary | ICD-10-CM

## 2017-08-28 DIAGNOSIS — F32.A DEPRESSION, UNSPECIFIED DEPRESSION TYPE: ICD-10-CM

## 2017-08-28 NOTE — PROGRESS NOTES
[facility] -  Case Progress Note      Client Name: Isabella Mclaughlin  YOB: 1965  MRN: [id number]  Treating Provider: Logan Arzate Psy.D., LP  Type of Session: Individual  Present in Session: Client only  Number of Minutes: 45  Treatment Plan due: 10/26/17  Date of Service: 8/28/17    Current Symptoms/Status:  Client is birth assigned male who identifies as a transgender woman. 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  She had vaginoplasty done in April 2017, which she reported has helped increase confidence. Client also reported experiencing chronic worry over making others feel uncomfortable because of her gender identification.  Client endorsed recent feelings of hopelessness related to others, particularly her family members ever supporting and understanding her as a trans* person.  Client reported anxiety symptoms including chronic worry and difficulty controlling worry, as well as depressive symptoms including negative self-talk, low mood,     Progress Toward Treatment Goals:   Client had an assertive conversation with her roommate.  She has been making efforts to expand her social network. She went on a date last week.      Intervention: Modality and Description/ Response:  Utilized cognitive behavioral, solution-focused, and psychodynamic techniques to address gender dysphoria and client's concerns regarding internalized transphobia and transition related goals.   Client reported that she noticed an improvement in mood since last session.  She reported she had an assertive conversation wit her roommate about him not paying rent and rsspecting her home.  Reinforced these efforts and identified improvements she has noticed as a result of doing so.  Client stated that she also threw a gathering at her house  "and met some new people and strengthened some of her existing relationships.  She added that she also went on a date last weekend, which went okay.  Client stated that she has been chatting with some other men as well.  Explored her feeling about dating post vaginoplasty.  She reported that it has helped her feel more confident in feeling more \"like a real woman.\"  Continued to discuss some of her concerns about \"depth issues\" in regard to her recovery.  She has an appointment scheduled for a follow-up with her surgeon.  Explored her feelings about potentially being sexual post-surgery.  She reported feeling ready, but also nervous.  Normalized client's feelings and identified boundaries that she wants to be keeping with potential dating partners.  Discussed this writer's transition from [facility] to [employer].  Client reported that she will be transferring with this writer and has sessions scheduled beginning in early October.  Discussed and identified emergency resources should any issues arise while this writer is unavailable in the month of September.  Client presented in an upbeat mood and was engaged and open to feedback.     Assignment:   Client will continue to work on assertiveness with others.    Ongoing: Client will continue to practice depression management strategies identified in session.  Client will continue to work on reaching out to her support network.     Diagnosis:  Gender dysphoria in adolescents or adults - 302.85  Unspecified Depressive Disorder- 311    R/O  Unspecified Anxiety Disorder    Interactive Complexity  None    Plan / Need for Future Services:  Client is transferring services to [employer] and will come in for follow up session at beginning of October.     Logan Arzate Psy.D., JENSEN  Postdoctoral Fellow                                  "

## 2017-08-28 NOTE — MR AVS SNAPSHOT
After Visit Summary   8/28/2017    Isabella Mclaughlin    MRN: [id number]           Patient Information     Date Of Birth          1965        Visit Information        Provider Department      8/28/2017 3:00 PM [facility]yD [facility]        Today's Diagnoses     Gender dysphoria in adolescent and adult    -  1    Depression, unspecified depression type           Follow-ups after your visit        Who to contact     Please call your clinic at [phone] to:    Ask questions about your health    Make or cancel appointments    Discuss your medicines    Learn about your test results    Speak to your doctor   If you have compliments or concerns about an experience at your clinic, or if you wish to file a complaint, please contact [facility] Patient Relations at [phone] or email us at [email or web address]         Additional Information About Your Visit        MyChart Information     ShopTextt gives you secure access to your electronic health record. If you see a primary care provider, you can also send messages to your care team and make appointments. If you have questions, please call your primary care clinic.  If you do not have a primary care provider, please call [phone] and they will assist you.      BullionVault is an electronic gateway that provides easy, online access to your medical records. With BullionVault, you can request a clinic appointment, read your test results, renew a prescription or communicate with your care team.     To access your existing account, please contact your [facility] or call [phone] for assistance.        Care EveryWhere ID     This is your Care EveryWhere ID. This could be used by other organizations to access your [place] medical records  [id number]         Blood Pressure from Last 3 Encounters:   04/25/17 100/66   09/21/16 99/64   03/17/16 110/73    Weight from Last 3  Encounters:   04/25/17 75.6 kg (166 lb 9.6 oz)   09/21/16 73 kg (161 lb)   03/17/16 75.8 kg (167 lb)              We Performed the Following     Individual Psychotherapy (38-52 min) [64913]        Primary Care Provider Office Phone # Fax #    Juan [facility] [phone] [address] [facility] [address]        Equal Access to Services     ESTEFANY DAO : Hadii aad ku hadasho Sooma[place], waaxda luqadaha, qaybta kaalmada adeegyada, waxay idiin hayaan adegladys geronimo. So [facility] [phone].    ATENCIÓN: Si habla español, tiene a mojica disposición servicios gratuitos de asistencia lingüística. Ll[facility] - [facility] [phone].    We comply with applicable federal civil rights laws and [place] laws. We do not discriminate on the basis of race, color, national origin, age, disability sex, sexual orientation or gender identity.            Thank you!     Thank you for choosing [facility]  for your care. Our goal is always to provide you with excellent care. Hearing back from our patients is one way we can continue to improve our services. Please take a few minutes to complete the written survey that you may receive in the mail after your visit with us. Thank you!             Your Updated Medication List - Protect others around you: Learn how to safely use, store and throw away your medicines at www.disposemymeds.org.          This list is accurate as of: 8/28/17 10:18 PM.  Always use your most recent med list.                   Brand Name Dispense Instructions for use Diagnosis    estradiol 0.1 MG/24HR BIW patch    VIVELLE-DOT    24 patch    Place 1 patch onto the skin twice a week    Hormone replacement therapy (postmenopausal)       OXYCODONE HCL PO      Take 5 mg by mouth as needed        PARoxetine 20 MG tablet    PAXIL     Take 20 mg by mouth

## 2020-03-02 ENCOUNTER — HEALTH MAINTENANCE LETTER (OUTPATIENT)
Age: 55
End: 2020-03-02

## 2020-06-04 NOTE — MR AVS SNAPSHOT
After Visit Summary   5/22/2017    Isabella Mclaughlin    MRN: [id number]           Patient Information     Date Of Birth          1965        Visit Information        Provider Department      5/22/2017 12:00 PM Logan Arzate PsyD [facility]        Today's Diagnoses     Gender dysphoria in adolescent and adult    -  1    Depression, unspecified depression type           Follow-ups after your visit        Your next 10 appointments already scheduled     Jul 17, 2017 11:00 AM CDT   INDIVIDUAL THERAPY with Logan Arzate PsyD   [facility] ([facility])    [address]  Mail Code 7521  [facility] [address]   [phone]            Jul 17, 2017  1:00 PM CDT   OFFICE VISIT with Markel Shepherd MD   [facility] ([facility])    [address]  Mail Code 7521  [facility] [address]   [phone]            Jul 24, 2017  1:00 PM CDT   INDIVIDUAL THERAPY with Logan Arzate PsyD   [facility] ([facility])    [address]  Mail Code 7521  [facility] [address]   [phone]              Who to contact     Please call your clinic at [phone] to:    Ask questions about your health    Make or cancel appointments    Discuss your medicines    Learn about your test results    Speak to your doctor   If you have compliments or concerns about an experience at your clinic, or if you wish to file a complaint, please contact [facility] Patient Relations at [phone] or email us at [email or web address]         Additional Information About Your Visit        MyChart Information     Moberg Researcht gives you secure access to your electronic health record. If you see a primary care provider, you can also send messages to your care team and make appointments. If you have questions, please call your primary care clinic.  If you do not have a primary care provider, please call [phone] and  Called pt. No answer. Left VM explaining GI appt. Asked for a call back. they will assist you.      Nautit is an electronic gateway that provides easy, online access to your medical records. With Nautit, you can request a clinic appointment, read your test results, renew a prescription or communicate with your care team.     To access your existing account, please contact your [facility] or call [phone] for assistance.        Care EveryWhere ID     This is your Care EveryWhere ID. This could be used by other organizations to access your [place] medical records  [id number]         Blood Pressure from Last 3 Encounters:   04/25/17 100/66   09/21/16 99/64   03/17/16 110/73    Weight from Last 3 Encounters:   04/25/17 75.6 kg (166 lb 9.6 oz)   09/21/16 73 kg (161 lb)   03/17/16 75.8 kg (167 lb)              We Performed the Following     Individual Psychotherapy (38-52 min) [38893]        Primary Care Provider Office Phone # Fax #    Juan [facility] [phone] [address] [facility] [address]        Thank you!     Thank you for choosing [facility]  for your care. Our goal is always to provide you with excellent care. Hearing back from our patients is one way we can continue to improve our services. Please take a few minutes to complete the written survey that you may receive in the mail after your visit with us. Thank you!             Your Updated Medication List - Protect others around you: Learn how to safely use, store and throw away your medicines at www.disposemymeds.org.          This list is accurate as of: 5/22/17  6:34 PM.  Always use your most recent med list.                   Brand Name Dispense Instructions for use    estradiol 0.1 MG/24HR BIW patch    VIVELLE-DOT    24 patch    Place 1 patch onto the skin twice a week       OXYCODONE HCL PO      Take 5 mg by mouth as needed       PARoxetine 20 MG tablet    PAXIL     Take 20 mg by mouth

## 2020-12-20 ENCOUNTER — HEALTH MAINTENANCE LETTER (OUTPATIENT)
Age: 55
End: 2020-12-20

## 2021-04-24 ENCOUNTER — HEALTH MAINTENANCE LETTER (OUTPATIENT)
Age: 56
End: 2021-04-24

## 2021-10-03 ENCOUNTER — HEALTH MAINTENANCE LETTER (OUTPATIENT)
Age: 56
End: 2021-10-03

## 2022-03-20 ENCOUNTER — HEALTH MAINTENANCE LETTER (OUTPATIENT)
Age: 57
End: 2022-03-20

## 2022-05-15 ENCOUNTER — HEALTH MAINTENANCE LETTER (OUTPATIENT)
Age: 57
End: 2022-05-15

## 2022-09-10 ENCOUNTER — HEALTH MAINTENANCE LETTER (OUTPATIENT)
Age: 57
End: 2022-09-10

## 2023-06-03 ENCOUNTER — HEALTH MAINTENANCE LETTER (OUTPATIENT)
Age: 58
End: 2023-06-03

## 2023-10-09 NOTE — PROGRESS NOTES
"[facility] -  Case Progress Note      Client Name: Isabella Mclaughlin  YOB: 1965  MRN: [id number]  Treating Provider: Logan Arzate Psy.D., LP  Type of Session: Individual  Present in Session: Client only  Number of Minutes: 55  Treatment Plan due: 10/26/17  Date of Service: 2/21/17      Current Symptoms/Status:  Client is birth assigned male who identifies as a transgender woman. 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  Client also reported experiencing chronic worry over making others feel uncomfortable because of her gender identification.  Client endorsed recent feelings of hopelessness related to others, particularly her family members ever supporting and understanding her as a trans* person.  Client reported anxiety symptoms including chronic worry and difficulty controlling worry, as well as depressive symptoms including negative self-talk, decreased mood, and passive suicidal ideation.     Progress Toward Treatment Goals:   She has been consistent in taking antidepressants (Paxil) and following through on behavioral activation techniques and has noted a significant improvement in depressive symptoms.  Client has been working to build her relationship with new friends.     Intervention: Modality and Description/ Response:  Utilized cognitive behavioral, solution-focused, and psychodynamic techniques to address gender dysphoria and client's concerns regarding internalized transphobia and transition related goals.  Client reported that her cousin backed out of taking care of her after surgery, but her uncle has stated that he will go and stay for the whole time.  Explored and identified her feelings about this.  She reported that her " Patient left voice message on Ob with only her first name no reason for call "depression has continued to improve since beginning Paxil.  Identified strategies for continuing to build on these improvements and explored activities that she might find pleasurable and something to look forward to. Discussed client going out on a \"sahara date\" with her boss.  She reported that it didn't feel like a good fit.  She stated that they did make plans to see each other again, as she wants to give him one more change.  She reported that she has been consistent in going to her [employer] group and has been developing a relationship with a woman there.  Reinforced these efforts.  Continued to process past events that contribute to ongoing negative self talk, specifically regarding her trans* identity.  Discussed how her coming out impacted her relationship with her siblings and her parents.  She stated her father was supportive until he passed and her mother never knew, as she passed before she came out.  She reported that she speaks with none of her siblings.  Discussed her ongoing resentments with her brother who testified against her in a custody hearing, which she feels was in reaction to her coming out as trans*.  Explored and processed her pain regarding these events and her brother's ongoing disapproval of her being trans*.  Validated client's feelings of hurt and betrayed.  Client was engaged and open to feedback.  She presented in an upbeat mood than in previous sessions.     Assignment:  Ongoing: Client will continue to practice depression management strategies identified in session.  Client will continue to work on reaching out to her support network.     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      Logan Arzate Psy.D., LP  Postdoctoral Fellow                                  "